# Patient Record
Sex: FEMALE
[De-identification: names, ages, dates, MRNs, and addresses within clinical notes are randomized per-mention and may not be internally consistent; named-entity substitution may affect disease eponyms.]

---

## 2023-09-08 ENCOUNTER — NURSE TRIAGE (OUTPATIENT)
Dept: OTHER | Facility: CLINIC | Age: 23
End: 2023-09-08

## 2023-09-08 NOTE — TELEPHONE ENCOUNTER
Location of patient: [place]    Subjective: Caller states \"I have a rash\"     Current Symptoms: Rash near thigh/hip, 6 bites on each leg, swelling to area    Onset: 2 weeks ago; gradual    Associated Symptoms: irritability , fussiness    Pain Severity: 0/10; N/A; none    Temperature: Denies fever     What has been tried: Cream, alcohol    Recommended disposition: See PCP within 24 Hours    Care advice provided, patient verbalizes understanding; denies any other questions or concerns; instructed to call back for any new or worsening symptoms. Caller will attempt to call  at [phone] to find information about teledoc. This RN recommended that patient be seen at [facility], tomorrow. This triage is a result of a call to [address] [place] [address]. Please do not respond to the triage nurse through this encounter. Any subsequent communication should be directly with the patient. Reason for Disposition   [1] Red or very tender (to touch) area AND [2] getting larger over 48 hours after the bite    Protocols used:  Insect Bite-ADULT-